# Patient Record
Sex: FEMALE | Race: AMERICAN INDIAN OR ALASKA NATIVE | NOT HISPANIC OR LATINO | URBAN - METROPOLITAN AREA
[De-identification: names, ages, dates, MRNs, and addresses within clinical notes are randomized per-mention and may not be internally consistent; named-entity substitution may affect disease eponyms.]

---

## 2024-07-10 ENCOUNTER — OFFICE VISIT (OUTPATIENT)
Dept: URBAN - METROPOLITAN AREA CLINIC 107 | Facility: CLINIC | Age: 69
End: 2024-07-10

## 2024-07-10 NOTE — HPI-TODAY'S VISIT:
68-year-old female new to the clinic here for epigastric pain   Past medical history of type 2 diabetes mellitus, history of kidney stones, frequent UTIs, hypertension, hyperlipidemia, ARJUN on CPAP.  Outside records reviewed:  CT abdomen and pelvis with contrast 12/18/23.  Hepatic steatosis.  GI otherwise normal.  Gallbladder is surgically absent. Labs 12/18/2023.  CBC: Hgb normal at 15.8, WBC 11.8.  CMP revealed potassium of 135, chloride 97.  Lipase normal.

## 2024-07-24 ENCOUNTER — OFFICE VISIT (OUTPATIENT)
Dept: URBAN - METROPOLITAN AREA CLINIC 107 | Facility: CLINIC | Age: 69
End: 2024-07-24
Payer: MEDICARE

## 2024-07-24 ENCOUNTER — LAB OUTSIDE AN ENCOUNTER (OUTPATIENT)
Dept: URBAN - METROPOLITAN AREA CLINIC 107 | Facility: CLINIC | Age: 69
End: 2024-07-24

## 2024-07-24 ENCOUNTER — DASHBOARD ENCOUNTERS (OUTPATIENT)
Age: 69
End: 2024-07-24

## 2024-07-24 VITALS
OXYGEN SATURATION: 96 % | RESPIRATION RATE: 18 BRPM | TEMPERATURE: 97.9 F | HEIGHT: 65 IN | BODY MASS INDEX: 35.45 KG/M2 | WEIGHT: 212.8 LBS | HEART RATE: 95 BPM | SYSTOLIC BLOOD PRESSURE: 150 MMHG | DIASTOLIC BLOOD PRESSURE: 66 MMHG

## 2024-07-24 DIAGNOSIS — R19.4 ALTERED BOWEL HABITS: ICD-10-CM

## 2024-07-24 DIAGNOSIS — K76.0 FATTY LIVER DISEASE, NONALCOHOLIC: ICD-10-CM

## 2024-07-24 DIAGNOSIS — Z86.010 HISTORY OF COLON POLYPS: ICD-10-CM

## 2024-07-24 DIAGNOSIS — K21.9 ACID REFLUX: ICD-10-CM

## 2024-07-24 PROBLEM — 428283002: Status: ACTIVE | Noted: 2024-07-24

## 2024-07-24 PROBLEM — 1231824009: Status: ACTIVE | Noted: 2024-07-24

## 2024-07-24 PROBLEM — 235595009: Status: ACTIVE | Noted: 2024-07-24

## 2024-07-24 PROCEDURE — 99204 OFFICE O/P NEW MOD 45 MIN: CPT | Performed by: NURSE PRACTITIONER

## 2024-07-24 RX ORDER — LOSARTAN POTASSIUM 50 MG/1
1 TABLET TABLET, FILM COATED ORAL ONCE A DAY
Status: ACTIVE | COMMUNITY

## 2024-07-24 RX ORDER — METFORMIN HYDROCHLORIDE 1000 MG/1
1 TABLET WITH A MEAL TABLET, FILM COATED ORAL TWICE A DAY
Status: ACTIVE | COMMUNITY

## 2024-07-24 RX ORDER — HYDROCHLOROTHIAZIDE 25 MG/1
1 TABLET IN THE MORNING TABLET ORAL ONCE A DAY
Status: ACTIVE | COMMUNITY

## 2024-07-24 RX ORDER — OMEPRAZOLE 40 MG/1
1 CAPSULE 30 MINUTES BEFORE MORNING MEAL CAPSULE, DELAYED RELEASE ORAL ONCE A DAY
Qty: 90 | Refills: 1 | OUTPATIENT
Start: 2024-07-24

## 2024-07-24 NOTE — PHYSICAL EXAM CONSTITUTIONAL:
normal , pleasant, well nourished, in no acute distress, needs assistance with ambulation-using a walker

## 2024-07-24 NOTE — HPI-TODAY'S VISIT:
68-year-old female new to the clinic here for epigastric pain  Past medical history of type 2 diabetes mellitus, history of kidney stones, frequent UTIs, hypertension, hyperlipidemia, ARJUN on CPAP. Has psoriasis.    Outside records reviewed:  CT abdomen and pelvis with contrast 12/18/23.  Hepatic steatosis.  GI otherwise normal.  Gallbladder is surgically absent. Labs 12/18/2023.  CBC: Hgb normal at 15.8, WBC 11.8.  CMP revealed potassium of 135, chloride 97.  Lipase normal. Labs 6/20/2024.  CMP revealed glucose of 294 with normal LFTs.  CBC revealed Hgb 14.8, HCT 42.9 otherwise normal.  QuantiFERON-TB gold negative.  Hepatitis panel nonreactive. She reports some RUQ pain that is intermittent. Has nausea, sometimes has GERD at night, takes tums  Had her gallbladder removed due to stones about 5 years ago. Has psoriasis and is a medicine she takes every 6 months.  She is working on losing weight. Has mucous in bowels which is abnormal for her, no melena or hematochezia.    Last colonoscopy-she can't remember where. Had 3 polyps removed around 7 years ago.

## 2024-07-29 ENCOUNTER — OFFICE VISIT (OUTPATIENT)
Dept: URBAN - METROPOLITAN AREA SURGERY CENTER 25 | Facility: SURGERY CENTER | Age: 69
End: 2024-07-29

## 2024-07-29 RX ORDER — HYDROCHLOROTHIAZIDE 25 MG/1
1 TABLET IN THE MORNING TABLET ORAL ONCE A DAY
Status: ACTIVE | COMMUNITY

## 2024-07-29 RX ORDER — LOSARTAN POTASSIUM 50 MG/1
1 TABLET TABLET, FILM COATED ORAL ONCE A DAY
Status: ACTIVE | COMMUNITY

## 2024-07-29 RX ORDER — METFORMIN HYDROCHLORIDE 1000 MG/1
1 TABLET WITH A MEAL TABLET, FILM COATED ORAL TWICE A DAY
Status: ACTIVE | COMMUNITY

## 2024-07-29 RX ORDER — OMEPRAZOLE 40 MG/1
1 CAPSULE 30 MINUTES BEFORE MORNING MEAL CAPSULE, DELAYED RELEASE ORAL ONCE A DAY
Qty: 90 | Refills: 1 | Status: ACTIVE | COMMUNITY
Start: 2024-07-24

## 2024-08-27 ENCOUNTER — OFFICE VISIT (OUTPATIENT)
Dept: URBAN - METROPOLITAN AREA SURGERY CENTER 25 | Facility: SURGERY CENTER | Age: 69
End: 2024-08-27

## 2024-09-16 ENCOUNTER — CLAIMS CREATED FROM THE CLAIM WINDOW (OUTPATIENT)
Dept: URBAN - METROPOLITAN AREA SURGERY CENTER 25 | Facility: SURGERY CENTER | Age: 69
End: 2024-09-16
Payer: MEDICARE

## 2024-09-16 ENCOUNTER — CLAIMS CREATED FROM THE CLAIM WINDOW (OUTPATIENT)
Dept: URBAN - METROPOLITAN AREA CLINIC 4 | Facility: CLINIC | Age: 69
End: 2024-09-16
Payer: MEDICARE

## 2024-09-16 ENCOUNTER — OFFICE VISIT (OUTPATIENT)
Dept: URBAN - METROPOLITAN AREA CLINIC 107 | Facility: CLINIC | Age: 69
End: 2024-09-16

## 2024-09-16 DIAGNOSIS — K64.0 FIRST DEGREE HEMORRHOIDS: ICD-10-CM

## 2024-09-16 DIAGNOSIS — K63.89 OTHER SPECIFIED DISEASES OF INTESTINE: ICD-10-CM

## 2024-09-16 DIAGNOSIS — R19.7 DIARRHEA, UNSPECIFIED: ICD-10-CM

## 2024-09-16 DIAGNOSIS — R19.5 OTHER FECAL ABNORMALITIES: ICD-10-CM

## 2024-09-16 PROCEDURE — 88305 TISSUE EXAM BY PATHOLOGIST: CPT | Performed by: PATHOLOGY

## 2024-09-16 PROCEDURE — 45380 COLONOSCOPY AND BIOPSY: CPT | Performed by: CLINIC/CENTER

## 2024-09-16 PROCEDURE — 00811 ANES LWR INTST NDSC NOS: CPT | Performed by: NURSE ANESTHETIST, CERTIFIED REGISTERED

## 2024-09-16 PROCEDURE — 88342 IMHCHEM/IMCYTCHM 1ST ANTB: CPT | Performed by: PATHOLOGY

## 2024-09-16 PROCEDURE — 00811 ANES LWR INTST NDSC NOS: CPT | Performed by: ANESTHESIOLOGY

## 2024-09-16 PROCEDURE — 88313 SPECIAL STAINS GROUP 2: CPT | Performed by: PATHOLOGY

## 2024-09-16 PROCEDURE — 45380 COLONOSCOPY AND BIOPSY: CPT | Performed by: INTERNAL MEDICINE

## 2024-09-16 RX ORDER — HYDROCHLOROTHIAZIDE 25 MG/1
1 TABLET IN THE MORNING TABLET ORAL ONCE A DAY
Status: ACTIVE | COMMUNITY

## 2024-09-16 RX ORDER — OMEPRAZOLE 40 MG/1
1 CAPSULE 30 MINUTES BEFORE MORNING MEAL CAPSULE, DELAYED RELEASE ORAL ONCE A DAY
Qty: 90 | Refills: 1 | Status: ACTIVE | COMMUNITY
Start: 2024-07-24

## 2024-09-16 RX ORDER — LOSARTAN POTASSIUM 50 MG/1
1 TABLET TABLET, FILM COATED ORAL ONCE A DAY
Status: ACTIVE | COMMUNITY

## 2024-09-16 RX ORDER — METFORMIN HYDROCHLORIDE 1000 MG/1
1 TABLET WITH A MEAL TABLET, FILM COATED ORAL TWICE A DAY
Status: ACTIVE | COMMUNITY

## 2024-10-07 ENCOUNTER — OFFICE VISIT (OUTPATIENT)
Dept: URBAN - METROPOLITAN AREA CLINIC 107 | Facility: CLINIC | Age: 69
End: 2024-10-07
Payer: MEDICARE

## 2024-10-07 VITALS
WEIGHT: 210.6 LBS | HEIGHT: 65 IN | TEMPERATURE: 97.3 F | RESPIRATION RATE: 18 BRPM | BODY MASS INDEX: 35.09 KG/M2 | OXYGEN SATURATION: 97 % | SYSTOLIC BLOOD PRESSURE: 176 MMHG | DIASTOLIC BLOOD PRESSURE: 116 MMHG

## 2024-10-07 DIAGNOSIS — R10.13 EPIGASTRIC PAIN: ICD-10-CM

## 2024-10-07 DIAGNOSIS — K21.9 GASTROESOPHAGEAL REFLUX DISEASE, UNSPECIFIED WHETHER ESOPHAGITIS PRESENT: ICD-10-CM

## 2024-10-07 DIAGNOSIS — R19.4 ALTERED BOWEL HABITS: ICD-10-CM

## 2024-10-07 DIAGNOSIS — K76.0 FATTY LIVER DISEASE, NONALCOHOLIC: ICD-10-CM

## 2024-10-07 DIAGNOSIS — Z86.0100 HISTORY OF COLON POLYPS: ICD-10-CM

## 2024-10-07 PROCEDURE — 99214 OFFICE O/P EST MOD 30 MIN: CPT | Performed by: NURSE PRACTITIONER

## 2024-10-07 RX ORDER — OMEPRAZOLE 40 MG/1
1 CAPSULE 30 MINUTES BEFORE MORNING MEAL CAPSULE, DELAYED RELEASE ORAL ONCE A DAY
Qty: 90 | Refills: 1 | Status: ACTIVE | COMMUNITY
Start: 2024-07-24

## 2024-10-07 RX ORDER — HYDROCHLOROTHIAZIDE 25 MG/1
1 TABLET IN THE MORNING TABLET ORAL ONCE A DAY
Status: ACTIVE | COMMUNITY

## 2024-10-07 RX ORDER — LOSARTAN POTASSIUM 50 MG/1
1 TABLET TABLET, FILM COATED ORAL ONCE A DAY
Status: ACTIVE | COMMUNITY

## 2024-10-07 RX ORDER — OMEPRAZOLE 40 MG/1
1 CAPSULE 30 MINUTES BEFORE MORNING MEAL CAPSULE, DELAYED RELEASE ORAL ONCE A DAY
Qty: 90 | Refills: 1 | OUTPATIENT

## 2024-10-07 RX ORDER — METFORMIN HYDROCHLORIDE 1000 MG/1
1 TABLET WITH A MEAL TABLET, FILM COATED ORAL TWICE A DAY
Status: ACTIVE | COMMUNITY

## 2024-10-07 NOTE — HPI-TODAY'S VISIT:
68-year-old female here for EGD and Colonoscopy follow-up.    Past medical history of type 2 diabetes mellitus, history of kidney stones, frequent UTIs, hypertension, hyperlipidemia, ARJUN on CPAP. Has psoriasis.    Last seen 7/24/2024 for epigastric pain, GERD, fatty liver, altered bowel habits and history of colon polyps.  EGD ordered for further evaluation, started on omeprazole 40 mg daily.  Weight loss encouraged for fatty liver.  For altered bowel habits h and history of colon polyps high-fiber diet recommended colonoscopy ordered for further evaluation.  Outside records reviewed:  CT abdomen and pelvis with contrast 12/18/23.  Hepatic steatosis.  GI otherwise normal.  Gallbladder is surgically absent. Labs 12/18/2023.  CBC: Hgb normal at 15.8, WBC 11.8.  CMP revealed potassium of 135, chloride 97.  Lipase normal. Labs 6/20/2024.  CMP revealed glucose of 294 with normal LFTs.  CBC revealed Hgb 14.8, HCT 42.9 otherwise normal.  QuantiFERON-TB gold negative.  Hepatitis panel nonreactive. She reports some RUQ pain that is intermittent. Has nausea, sometimes has GERD at night, takes tums  Had her gallbladder removed due to stones about 5 years ago. Has psoriasis and is a medicine she takes every 6 months.  She is working on losing weight. Has mucous in bowels which is abnormal for her, no melena or hematochezia.    Last colonoscopy-she can't remember where. Had 3 polyps removed around 7 years ago.  Interval history, 10/07/2024 EGD 7/29/2024. LA grade a esophagitis without bleeding. Patchy erythematous mucosa in antrum and body. Duodenum was normal. Antral biopsy revealed mild chronic gastritis no H. pylori or IM. GE junction biopsy revealed chronic inflammation no IM or dysplasia. Random esophageal biopsy revealed no significant abnormality negative for eosinophils. Colonoscopy 9/16/2024. Redundant colon, nonbleeding internal hemorrhoids no polyps seen. Random colon biopsy normal.  Today she is doing well.  She is eating more fiber. Stools are formed. No melena or hematochezia.  Hemorrhoids are not bothersome. Gave up tomatoes, on PPI.

## 2025-04-07 ENCOUNTER — LAB OUTSIDE AN ENCOUNTER (OUTPATIENT)
Dept: URBAN - METROPOLITAN AREA CLINIC 107 | Facility: CLINIC | Age: 70
End: 2025-04-07

## 2025-04-07 ENCOUNTER — OFFICE VISIT (OUTPATIENT)
Dept: URBAN - METROPOLITAN AREA CLINIC 107 | Facility: CLINIC | Age: 70
End: 2025-04-07
Payer: COMMERCIAL

## 2025-04-07 DIAGNOSIS — R19.4 ALTERED BOWEL HABITS: ICD-10-CM

## 2025-04-07 DIAGNOSIS — K21.9 GASTROESOPHAGEAL REFLUX DISEASE, UNSPECIFIED WHETHER ESOPHAGITIS PRESENT: ICD-10-CM

## 2025-04-07 DIAGNOSIS — Z86.0101 HISTORY OF ADENOMATOUS POLYP OF COLON: ICD-10-CM

## 2025-04-07 DIAGNOSIS — K76.0 FATTY LIVER DISEASE, NONALCOHOLIC: ICD-10-CM

## 2025-04-07 PROCEDURE — 99213 OFFICE O/P EST LOW 20 MIN: CPT | Performed by: NURSE PRACTITIONER

## 2025-04-07 RX ORDER — METFORMIN HYDROCHLORIDE 1000 MG/1
1 TABLET WITH A MEAL TABLET, FILM COATED ORAL TWICE A DAY
Status: ACTIVE | COMMUNITY

## 2025-04-07 RX ORDER — LOSARTAN POTASSIUM 50 MG/1
1 TABLET TABLET, FILM COATED ORAL ONCE A DAY
Status: ACTIVE | COMMUNITY

## 2025-04-07 RX ORDER — OMEPRAZOLE 40 MG/1
1 CAPSULE 30 MINUTES BEFORE MORNING MEAL CAPSULE, DELAYED RELEASE ORAL ONCE A DAY
Qty: 90 | Refills: 1 | Status: ACTIVE | COMMUNITY

## 2025-04-07 RX ORDER — DAPAGLIFLOZIN 5 MG/1
1 TABLET TABLET, FILM COATED ORAL ONCE A DAY
Status: ACTIVE | COMMUNITY

## 2025-04-07 RX ORDER — HYDROCHLOROTHIAZIDE 25 MG/1
1 TABLET IN THE MORNING TABLET ORAL ONCE A DAY
Status: ACTIVE | COMMUNITY

## 2025-04-07 RX ORDER — VALSARTAN 80 MG/1
1 TABLET TABLET, FILM COATED ORAL ONCE A DAY
Status: ACTIVE | COMMUNITY

## 2025-04-07 NOTE — HPI-TODAY'S VISIT:
69-year-old female here for a follow-up.    Past medical history of type 2 diabetes mellitus, history of kidney stones, frequent UTIs, hypertension, hyperlipidemia, ARJUN on CPAP. Has psoriasis.    Last seen 10/7/2024 for GERD and epigastric pain started on omeprazole 40 mg daily. Weight loss encouraged her fatty liver disease. For altered bowel habits high-fiber diet recommended. Due for surveillance colonoscopy 2029 for history of colon polyps.  Outside records reviewed:  CT abdomen and pelvis with contrast 12/18/23.  Hepatic steatosis.  GI otherwise normal.  Gallbladder is surgically absent. Labs 12/18/2023.  CBC: Hgb normal at 15.8, WBC 11.8.  CMP revealed potassium of 135, chloride 97.  Lipase normal. Labs 6/20/2024.  CMP revealed glucose of 294 with normal LFTs.  CBC revealed Hgb 14.8, HCT 42.9 otherwise normal.  QuantiFERON-TB gold negative.  Hepatitis panel nonreactive. She reports some RUQ pain that is intermittent. Has nausea, sometimes has GERD at night, takes tums  Had her gallbladder removed due to stones about 5 years ago. Has psoriasis and is a medicine she takes every 6 months.  She is working on losing weight. Has mucous in bowels which is abnormal for her, no melena or hematochezia.  Interval history, 10/07/2024 EGD 7/29/2024. LA grade a esophagitis without bleeding. Patchy erythematous mucosa in antrum and body. Duodenum was normal. Antral biopsy revealed mild chronic gastritis no H. pylori or IM. GE junction biopsy revealed chronic inflammation no IM or dysplasia. Random esophageal biopsy revealed no significant abnormality negative for eosinophils. Colonoscopy 9/16/2024. Redundant colon, nonbleeding internal hemorrhoids no polyps seen. Random colon biopsy normal.  Today she is doing well.  She is eating more fiber. Stools are formed. No melena or hematochezia.  Hemorrhoids are not bothersome. Gave up tomatoes, on PPI.   Interval history, 4/7/2025  Labs 12/27/2024. Vitamin B1 normal.  Labs 4/2/25. Hep A antibody-negative, hep B antigen screen negative, hep B surface antibody nonreactive nonimmune to hepatitis B. Hep B core antibody-negative. HCV antibody nonreactive  She is doing well today. GERD controlled with PPI.  Down 13 pounds from last appointment.

## 2025-04-17 ENCOUNTER — TELEPHONE ENCOUNTER (OUTPATIENT)
Dept: URBAN - METROPOLITAN AREA CLINIC 107 | Facility: CLINIC | Age: 70
End: 2025-04-17

## 2025-04-18 ENCOUNTER — CLAIMS CREATED FROM THE CLAIM WINDOW (OUTPATIENT)
Dept: URBAN - METROPOLITAN AREA CLINIC 117 | Facility: CLINIC | Age: 70
End: 2025-04-18
Payer: COMMERCIAL

## 2025-04-18 ENCOUNTER — OFFICE VISIT (OUTPATIENT)
Dept: URBAN - METROPOLITAN AREA CLINIC 112 | Facility: CLINIC | Age: 70
End: 2025-04-18
Payer: COMMERCIAL

## 2025-04-18 DIAGNOSIS — K74.02 HEPATIC FIBROSIS, ADVANCED FIBROSIS: ICD-10-CM

## 2025-04-18 DIAGNOSIS — K76.0 FATTY LIVER DISEASE, NONALCOHOLIC: ICD-10-CM

## 2025-04-18 DIAGNOSIS — K74.60 CIRRHOSIS, UNSPECIFIED: ICD-10-CM

## 2025-04-18 DIAGNOSIS — K76.0 FATTY LIVER: ICD-10-CM

## 2025-04-18 PROCEDURE — 76981 USE PARENCHYMA: CPT | Performed by: INTERNAL MEDICINE

## 2025-04-18 RX ORDER — OMEPRAZOLE 40 MG/1
1 CAPSULE 30 MINUTES BEFORE MORNING MEAL CAPSULE, DELAYED RELEASE ORAL ONCE A DAY
Qty: 90 | Refills: 1 | Status: ACTIVE | COMMUNITY

## 2025-04-18 RX ORDER — LOSARTAN POTASSIUM 50 MG/1
1 TABLET TABLET, FILM COATED ORAL ONCE A DAY
Status: ACTIVE | COMMUNITY

## 2025-04-18 RX ORDER — METFORMIN HYDROCHLORIDE 1000 MG/1
1 TABLET WITH A MEAL TABLET, FILM COATED ORAL TWICE A DAY
Status: ACTIVE | COMMUNITY

## 2025-04-18 RX ORDER — DAPAGLIFLOZIN 5 MG/1
1 TABLET TABLET, FILM COATED ORAL ONCE A DAY
Status: ACTIVE | COMMUNITY

## 2025-04-18 RX ORDER — VALSARTAN 80 MG/1
1 TABLET TABLET, FILM COATED ORAL ONCE A DAY
Status: ACTIVE | COMMUNITY

## 2025-04-18 RX ORDER — HYDROCHLOROTHIAZIDE 25 MG/1
1 TABLET IN THE MORNING TABLET ORAL ONCE A DAY
Status: ACTIVE | COMMUNITY

## 2025-07-13 ENCOUNTER — ERX REFILL RESPONSE (OUTPATIENT)
Dept: URBAN - METROPOLITAN AREA CLINIC 72 | Facility: CLINIC | Age: 70
End: 2025-07-13

## 2025-07-13 RX ORDER — OMEPRAZOLE 40 MG/1
TAKE 1 CAPSULE BY MOUTH ONCE DAILY 30 MINUTES BEFORE MORNING MEAL CAPSULE, DELAYED RELEASE ORAL
Qty: 90 CAPSULE | Refills: 0